# Patient Record
Sex: FEMALE | Race: WHITE
[De-identification: names, ages, dates, MRNs, and addresses within clinical notes are randomized per-mention and may not be internally consistent; named-entity substitution may affect disease eponyms.]

---

## 2021-10-09 ENCOUNTER — HOSPITAL ENCOUNTER (EMERGENCY)
Dept: HOSPITAL 79 - ER1 | Age: 41
Discharge: HOME | End: 2021-10-09
Payer: COMMERCIAL

## 2021-10-09 DIAGNOSIS — R56.9: Primary | ICD-10-CM

## 2021-10-09 LAB
BUN/CREATININE RATIO: 14 (ref 0–10)
HGB BLD-MCNC: 14.6 GM/DL (ref 12.3–15.3)
RED BLOOD COUNT: 4.7 M/UL (ref 4–5.1)
WHITE BLOOD COUNT: 8 K/UL (ref 4.5–11)

## 2022-06-20 ENCOUNTER — HOSPITAL ENCOUNTER (OUTPATIENT)
Dept: HOSPITAL 79 - OPSV2 | Age: 42
End: 2022-06-20
Attending: OBSTETRICS & GYNECOLOGY
Payer: COMMERCIAL

## 2022-06-20 DIAGNOSIS — R10.2: ICD-10-CM

## 2022-06-20 DIAGNOSIS — Z01.818: Primary | ICD-10-CM

## 2022-06-20 LAB
HGB BLD-MCNC: 12.9 GM/DL (ref 12.3–15.3)
RED BLOOD COUNT: 4.34 M/UL (ref 4–5.1)
WHITE BLOOD COUNT: 3.7 K/UL (ref 4.5–11)

## 2022-06-21 ENCOUNTER — HOSPITAL ENCOUNTER (INPATIENT)
Dept: HOSPITAL 79 - OR | Age: 42
LOS: 2 days | Discharge: HOME | DRG: 743 | End: 2022-06-23
Attending: OBSTETRICS & GYNECOLOGY | Admitting: OBSTETRICS & GYNECOLOGY
Payer: COMMERCIAL

## 2022-06-21 VITALS — BODY MASS INDEX: 23.14 KG/M2 | WEIGHT: 144 LBS | HEIGHT: 66 IN

## 2022-06-21 DIAGNOSIS — Z80.3: ICD-10-CM

## 2022-06-21 DIAGNOSIS — Z79.899: ICD-10-CM

## 2022-06-21 DIAGNOSIS — Z80.0: ICD-10-CM

## 2022-06-21 DIAGNOSIS — N80.9: ICD-10-CM

## 2022-06-21 DIAGNOSIS — Z82.49: ICD-10-CM

## 2022-06-21 DIAGNOSIS — D25.9: Primary | ICD-10-CM

## 2022-06-21 DIAGNOSIS — R10.2: ICD-10-CM

## 2022-06-21 DIAGNOSIS — G40.909: ICD-10-CM

## 2022-06-21 DIAGNOSIS — N64.4: ICD-10-CM

## 2022-06-21 DIAGNOSIS — K21.9: ICD-10-CM

## 2022-06-21 DIAGNOSIS — N92.0: ICD-10-CM

## 2022-06-21 LAB — HGB BLD-MCNC: 11.2 GM/DL (ref 12.3–15.3)

## 2022-06-21 PROCEDURE — 0UT90ZL RESECTION OF UTERUS, SUPRACERVICAL, OPEN APPROACH: ICD-10-PCS | Performed by: OBSTETRICS & GYNECOLOGY

## 2022-06-21 PROCEDURE — 0UB60ZZ EXCISION OF LEFT FALLOPIAN TUBE, OPEN APPROACH: ICD-10-PCS | Performed by: OBSTETRICS & GYNECOLOGY

## 2022-06-21 PROCEDURE — 0UB50ZZ EXCISION OF RIGHT FALLOPIAN TUBE, OPEN APPROACH: ICD-10-PCS | Performed by: OBSTETRICS & GYNECOLOGY

## 2022-06-21 PROCEDURE — 0UB00ZZ EXCISION OF RIGHT OVARY, OPEN APPROACH: ICD-10-PCS | Performed by: OBSTETRICS & GYNECOLOGY

## 2022-06-21 NOTE — NUR
NOTIFIED DR PADRON OF PT C/O PAIN, NOT MARYLIN PO, ORDER TO NOTIFY DR GREER, DR GREER NOTIFIED, NEW ORDERS NOTED FOR MORPHINE, AND PHENERGAN, PT DENIES
NAUSEA, JUST NOT ABLE TO EAT R/T PAIN, THEN MD CALLED BACK AND STATES SHE
WANTS HER TO START TORADOL AND HOLD NAPROXEN. ORDERS WRITTEN AND SCANNED TO
PHARMACY.   #[id number]

## 2022-06-22 LAB — HGB BLD-MCNC: 9.8 GM/DL (ref 12.3–15.3)

## 2022-06-22 NOTE — NUR
RN IN ROOM TO CHECK ON PATIENT. PATIENT'S  GONE. RN POINTED AT [facility] AND
PATIENT SHOOK HEAD NO AGAIN. PATIENT GIVES THUMBS UP.

## 2022-06-23 NOTE — NUR
PT  SPEAKS ENGLISH EDUCATION GIVE IN ENGLISH PER  REQUEST.  PT
SPEAKS THIA.    CAREGIVER AND VERBAL COMMUNICATOR REQUEST TO NOT USE
LANGUAGE SCREEN, PT REFUSED TO USE.   WILL TEACH PATIENT NEW MEDS.

## 2023-09-14 ENCOUNTER — OFFICE VISIT (OUTPATIENT)
Dept: PSYCHIATRY | Facility: CLINIC | Age: 43
End: 2023-09-14
Payer: MEDICAID

## 2023-09-14 VITALS
HEIGHT: 63 IN | WEIGHT: 120 LBS | SYSTOLIC BLOOD PRESSURE: 114 MMHG | BODY MASS INDEX: 21.26 KG/M2 | HEART RATE: 70 BPM | DIASTOLIC BLOOD PRESSURE: 78 MMHG

## 2023-09-14 DIAGNOSIS — F43.23 ADJUSTMENT DISORDER WITH MIXED ANXIETY AND DEPRESSED MOOD: ICD-10-CM

## 2023-09-14 RX ORDER — SERTRALINE HYDROCHLORIDE 25 MG/1
25 TABLET, FILM COATED ORAL DAILY
Qty: 180 TABLET | Refills: 0 | Status: SHIPPED | OUTPATIENT
Start: 2023-09-14

## 2023-09-14 NOTE — PROGRESS NOTES
Subjective     Alcira Porter is a 42 y.o. female who presents today for follow up    Chief Complaint:  Depression, guilt       History of Present Illness:    History of Present Illness        A trained  (Linda), using a video interpretation system, was used for this visit as patient speaks Shane with minimal English. Alcira is a 42-year-old female who presents today with her  who does speak adequate English.  Tells me that she has been doing fine with no new news.  She reports that her sleep is okay and appetite is okay.  She denies any racing thoughts.  Denies any anxiety.  Tells me that everything has been going fine.  Denies any feelings of sadness.  Denies any suicidal or homicidal thoughts.  She tells me that she is feeling good now and would like to come off of some of the medications.  She denies any seizures recently and she has a follow-up with her neurologist soon.  Patient's  who is present today states that they will be leaving to go to [facility] for 6 months on October 18.  She does seem somewhat guarded but that could be due to the communication barrier.     The following portions of the patient's history were reviewed and updated as appropriate: allergies, current medications, past family history, past medical history, past social history, past surgical history and problem list.    Past Psychiatric History:  Began Treatment:This year  Diagnoses: Bipolar Disorder  Psychiatrist:Denies  Therapist:Denies  Admission History:Denies  Medication Trials: Risperdal was listed in patient's chart but patient and spouse state that she has never taken this medication.  Self Harm: Denies  Suicide Attempts:Denies    Past Medical History:  Past Medical History:   Diagnosis Date    Bipolar disorder     Depression     Ovarian cyst     Seizures        Substance Abuse History:   Types:Denies all, including illicit  Withdrawal Symptoms:Denies  Longest Period Sober:Not Applicable    AA: Not applicable     Social History:  Social History     Socioeconomic History    Marital status:    Tobacco Use    Smoking status: Never    Smokeless tobacco: Never   Vaping Use    Vaping Use: Never used   Substance and Sexual Activity    Alcohol use: Never    Drug use: Never    Sexual activity: Defer       Family History:  Family History   Family history unknown: Yes       Past Surgical History:  History reviewed. No pertinent surgical history.    Problem List:  There is no problem list on file for this patient.      Allergy:   No Known Allergies     Current Medications:   Current Outpatient Medications   Medication Sig Dispense Refill    Brivaracetam 100 MG tablet Take 50 mg by mouth.      Cholecalciferol 25 MCG (1000 UT) tablet Take 1 tablet by mouth Daily.      lacosamide (VIMPAT) 200 MG tablet Take 300 mg by mouth.      sertraline (Zoloft) 25 MG tablet Take 1 tablet by mouth Daily. 180 tablet 0     No current facility-administered medications for this visit.       Review of Systems:    Review of Systems   Constitutional:  Negative for activity change, appetite change, fatigue and fever.   Cardiovascular: Negative.    Neurological:  Positive for seizures.   Psychiatric/Behavioral:  Positive for depressed mood and stress. Negative for agitation, behavioral problems, decreased concentration, dysphoric mood, hallucinations, self-injury, sleep disturbance, suicidal ideas and negative for hyperactivity. The patient is not nervous/anxious.        Physical Exam:   Physical Exam  Vitals reviewed.   Constitutional:       Appearance: Normal appearance.   Pulmonary:      Effort: Pulmonary effort is normal.   Musculoskeletal:         General: Normal range of motion.      Cervical back: Normal range of motion.   Neurological:      General: No focal deficit present.      Mental Status: She is alert and oriented to person, place, and time. Mental status is at baseline.   Psychiatric:         Attention and  "Perception: Attention and perception normal.         Mood and Affect: Affect normal. Mood is depressed. Affect is not flat.         Speech: Speech normal.         Behavior: Behavior normal. Behavior is cooperative.         Thought Content: Thought content normal. Thought content is not paranoid. Thought content does not include homicidal or suicidal ideation.         Cognition and Memory: Cognition and memory normal.         Judgment: Judgment normal.       Vitals:  Blood pressure 114/78, pulse 70, height 160 cm (62.99\"), weight 54.4 kg (120 lb).   Body mass index is 21.26 kg/m².    Last 3 Blood Pressure Readings:  BP Readings from Last 3 Encounters:   09/14/23 114/78   07/21/23 104/76   06/23/23 118/70           Mental Status Exam:   Hygiene:   fair  Cooperation:  Guarded  Eye Contact:  Poor  Psychomotor Behavior:  Slow  Affect:  Restricted  Mood: sad and depressed  Hopelessness: Denies  Speech:   Malaysian  was used for this visit  Thought Process:  Linear  Thought Content:  Normal  Suicidal:  None  Homicidal:  None  Hallucinations:  None  Delusion:  None  Memory:  Intact  Orientation:  Person, Place, Time and Situation  Reliability:  fair  Insight:  Fair  Judgement:  Fair  Impulse Control:  Good  Physical/Medical Issues:  Yes See past medical history        Lab Results:   Office Visit on 06/23/2023   Component Date Value Ref Range Status    External Amphetamine Screen Urine 06/23/2023 Negative   Final    External Benzodiazepine Screen Uri* 06/23/2023 Negative   Final    External Cocaine Screen Urine 06/23/2023 Negative   Final    External THC Screen Urine 06/23/2023 Negative   Final    External Methadone Screen Urine 06/23/2023 Negative   Final    External Methamphetamine Screen Ur* 06/23/2023 Negative   Final    External Oxycodone Screen Urine 06/23/2023 Negative   Final    External Buprenorphine Screen Urine 06/23/2023 Negative   Final    External MDMA 06/23/2023 Negative   Final    External Opiates " Screen Urine 06/23/2023 Negative   Final         Assessment & Plan   Problems Addressed this Visit    None  Visit Diagnoses       Adjustment disorder with mixed anxiety and depressed mood        Relevant Medications    sertraline (Zoloft) 25 MG tablet          Diagnoses         Codes Comments    Adjustment disorder with mixed anxiety and depressed mood     ICD-10-CM: F43.23  ICD-9-CM: 309.28             Visit Diagnoses:    ICD-10-CM ICD-9-CM   1. Adjustment disorder with mixed anxiety and depressed mood  F43.23 309.28       GOALS:  Short Term Goals: Patient will be compliant with medication, and patient will have no significant medication related side effects.  Patient will be engaged in psychotherapy as indicated.  Patient will report subjective improvement of symptoms.  Long term goals: To stabilize mood and treat/improve subjective symptoms, the patient will stay out of the hospital, the patient will be at an optimal level of functioning, and the patient will take all medications as prescribed.  The patient/guardian verbalized understanding and agreement with goals that were mutually set.      TREATMENT PLAN: Continue supportive psychotherapy efforts and medications as indicated.  Pharmacological and Non-Pharmacological treatment options discussed during today's visit. Patient/Guardian acknowledged and verbally consented with current treatment plan and was educated on the importance of compliance with treatment and follow-up appointments.      MEDICATION ISSUES:  Discussed medication options and treatment plan of prescribed medication as well as the risks, benefits, any black box warnings, and side effects including potential falls, possible impaired driving, and metabolic adversities among others. Patient is agreeable to call the office with any worsening of symptoms or onset of side effects, or if any concerns or questions arise.  The contact information for the office is made available to the patient. Patient  is agreeable to call 911 or go to the nearest ER should they begin having any SI/HI, or if any urgent concerns arise. No medication side effects or related complaints today.     MEDS ORDERED DURING VISIT:  New Medications Ordered This Visit   Medications    sertraline (Zoloft) 25 MG tablet     Sig: Take 1 tablet by mouth Daily.     Dispense:  180 tablet     Refill:  0     Plan:  - Will decrease Zoloft to 25 mg p.o. daily for adjustment disorder with mixed anxiety and depression.   - We will follow-up in 7 months after return to the [place].  - Discussed with patient and spouse side effects of medications and when to call the office if the side effects occur.    Follow Up Appointment:   Return in about 3 months (around 12/14/2023) for Recheck.             This document has been electronically signed by ANGELA Aguilar  September 14, 2023 12:53 EDT    Dictated Utilizing Dragon Dictation: Part of this note may be an electronic transcription/translation of spoken language to printed text using the Dragon Dictation System.